# Patient Record
(demographics unavailable — no encounter records)

---

## 2024-12-20 NOTE — ASSESSMENT
[FreeTextEntry1] : Ms. Zuniga is a 69-year-old woman with history of mediastinal sarcoidosis, NSVT, mitral valve prolapse with moderate MR, and dysautonomia presenting to establish care with a primary cardiologist.  Impression: (1) Sarcoidosis, no known cardiac involvement (2) MVP with moderate MR previously, trace on most recent TTE (3) SVT, subsequent CCTA with no CAD, CAC 0, following with EP (4) Dysautonomia by patient report, symptoms appear more consistent with vertigo  Plan: - Annual TTE in light of sarcoidosis, MVP, mod MR on prior TTE - Annual event monitor, will place one today - OK to continue ASA for now, low threshold to discontinue if she has bleeding issues - Continue metoprolol for hx of SVT - Continue statin  RTC 6 months, sooner as needed

## 2024-12-20 NOTE — HISTORY OF PRESENT ILLNESS
[FreeTextEntry1] : Ms. Zuniga is a 69-year-old woman with history of mediastinal sarcoidosis, SVT, mitral valve prolapse with moderate MR, and dysautonomia presenting for follow up.  She follows with Dr. Bernabe and was last seen in office 9/2023. At that time was recommended medical management of SVT with ongoing follow up. She previously followed with Dr. Quinones. She would like to transition her care.  Today feels well, denies active cardiopulmonary complaints.  TTE 7/2024: EF 55%, bileaflet prolapse, trace MR TTE 3/2023: normal EF, moderate MR CTA coronaries 6/2020: CAC 0, normal coronaries 7 Day MCOT 2023: 4 episodes of brief SVT, PVCs 0.18% 7 Day ePatch 2024: 15 runs of SVT up to 11 beats, 0.2% PVC burden including a triplet  Labs: - , HDL 42, , LDL 61 - Cr 0.92, K 4.9, ALT 44, AST 25 - A1c 5.8%, TSH 1.25  Meds: - ASA 81mg - Atorva 40mg - Metoprolol succinate 75mg

## 2025-06-20 NOTE — ASSESSMENT
[FreeTextEntry1] : Ms. Zuniga is a 69-year-old woman with history of mediastinal sarcoidosis, NSVT, mitral valve prolapse with moderate MR, and dysautonomia presenting to establish care with a primary cardiologist.  Impression: (1) Sarcoidosis, no known cardiac involvement (2) MVP with moderate MR previously, trace on most recent TTE (3) SVT, subsequent CCTA with no CAD, CAC 0, following with EP (4) Dysautonomia by patient report, symptoms appear more consistent with vertigo  Plan: - Annual TTE in light of sarcoidosis, MVP, mod MR on prior TTE - OK to continue ASA for now, low threshold to discontinue if she has bleeding issues - Continue metoprolol for hx of SVT, can decrease to 50mg - Continue statin  RTC 6 months, sooner as needed

## 2025-06-20 NOTE — HISTORY OF PRESENT ILLNESS
[FreeTextEntry1] : Ms. Zuniga is a 70-year-old woman with history of mediastinal sarcoidosis, SVT, mitral valve prolapse with moderate MR, and dysautonomia presenting for follow up.  She follows with Dr. Bernabe and was last seen in office 9/2023. At that time was recommended medical management of SVT with ongoing follow up. She previously followed with Dr. Quinones. She would like to transition her care.  Today feels well, denies active cardiopulmonary complaints.  TTE 3/2023: normal EF, moderate MR TTE 7/2024: EF 55%, bileaflet prolapse, trace MR TTE 6/2025: Normal EF, bileaflet prolapse, trace MR CTA coronaries 6/2020: CAC 0, normal coronaries 7 Day MCOT 2023: 4 episodes of brief SVT, PVCs 0.18% 7 Day ePatch 2024: 15 runs of SVT up to 11 beats, 0.2% PVC burden including a triplet  Labs: - , HDL 42, , LDL 61 -> LDL 77, HDL 47,  - Cr 0.92, K 4.9, [id number], AST 22 - A1c 6.2%, TSH 1.25, pBNP 60  Meds: - ASA 81mg - Atorva 40mg - Metoprolol succinate 75mg